# Patient Record
Sex: FEMALE | Race: WHITE
[De-identification: names, ages, dates, MRNs, and addresses within clinical notes are randomized per-mention and may not be internally consistent; named-entity substitution may affect disease eponyms.]

---

## 2017-05-31 ENCOUNTER — HOSPITAL ENCOUNTER (OUTPATIENT)
Dept: HOSPITAL 62 - OD | Age: 57
End: 2017-05-31
Attending: NURSE PRACTITIONER
Payer: COMMERCIAL

## 2017-05-31 DIAGNOSIS — Z13.220: Primary | ICD-10-CM

## 2017-05-31 LAB
ALBUMIN SERPL-MCNC: 4.6 G/DL (ref 3.5–5)
ALP SERPL-CCNC: 66 U/L (ref 38–126)
ALT SERPL-CCNC: 24 U/L (ref 9–52)
ANION GAP SERPL CALC-SCNC: 9 MMOL/L (ref 5–19)
AST SERPL-CCNC: 25 U/L (ref 14–36)
BASOPHILS # BLD AUTO: 0 10^3/UL (ref 0–0.2)
BASOPHILS NFR BLD AUTO: 0.6 % (ref 0–2)
BILIRUB DIRECT SERPL-MCNC: 0.3 MG/DL (ref 0–0.4)
BILIRUB SERPL-MCNC: 0.5 MG/DL (ref 0.2–1.3)
BUN SERPL-MCNC: 10 MG/DL (ref 7–20)
CALCIUM: 10 MG/DL (ref 8.4–10.2)
CHLORIDE SERPL-SCNC: 103 MMOL/L (ref 98–107)
CHOLEST SERPL-MCNC: 208.15 MG/DL (ref 0–200)
CO2 SERPL-SCNC: 28 MMOL/L (ref 22–30)
CREAT SERPL-MCNC: 0.84 MG/DL (ref 0.52–1.25)
DIRECT HDL: 77 MG/DL (ref 40–?)
EOSINOPHIL # BLD AUTO: 0.2 10^3/UL (ref 0–0.6)
EOSINOPHIL NFR BLD AUTO: 3.1 % (ref 0–6)
ERYTHROCYTE [DISTWIDTH] IN BLOOD BY AUTOMATED COUNT: 13.2 % (ref 11.5–14)
GLUCOSE SERPL-MCNC: 84 MG/DL (ref 75–110)
HCT VFR BLD CALC: 40.4 % (ref 36–47)
HGB BLD-MCNC: 13.2 G/DL (ref 12–15.5)
HGB HCT DIFFERENCE: -0.8
LDLC SERPL DIRECT ASSAY-MCNC: 107 MG/DL (ref ?–100)
LYMPHOCYTES # BLD AUTO: 2.8 10^3/UL (ref 0.5–4.7)
LYMPHOCYTES NFR BLD AUTO: 41.3 % (ref 13–45)
MCH RBC QN AUTO: 30.9 PG (ref 27–33.4)
MCHC RBC AUTO-ENTMCNC: 32.6 G/DL (ref 32–36)
MCV RBC AUTO: 95 FL (ref 80–97)
MONOCYTES # BLD AUTO: 0.3 10^3/UL (ref 0.1–1.4)
MONOCYTES NFR BLD AUTO: 4 % (ref 3–13)
NEUTROPHILS # BLD AUTO: 3.4 10^3/UL (ref 1.7–8.2)
NEUTS SEG NFR BLD AUTO: 51 % (ref 42–78)
POTASSIUM SERPL-SCNC: 4.4 MMOL/L (ref 3.6–5)
PROT SERPL-MCNC: 7.3 G/DL (ref 6.3–8.2)
RBC # BLD AUTO: 4.25 10^6/UL (ref 3.72–5.28)
SODIUM SERPL-SCNC: 140 MMOL/L (ref 137–145)
TRIGL SERPL-MCNC: 90 MG/DL (ref ?–150)
VLDLC SERPL CALC-MCNC: 18 MG/DL (ref 10–31)
WBC # BLD AUTO: 6.7 10^3/UL (ref 4–10.5)

## 2017-05-31 PROCEDURE — 80061 LIPID PANEL: CPT

## 2017-05-31 PROCEDURE — 80053 COMPREHEN METABOLIC PANEL: CPT

## 2017-05-31 PROCEDURE — 85025 COMPLETE CBC W/AUTO DIFF WBC: CPT

## 2017-05-31 PROCEDURE — 36415 COLL VENOUS BLD VENIPUNCTURE: CPT

## 2017-08-28 ENCOUNTER — HOSPITAL ENCOUNTER (OUTPATIENT)
Dept: HOSPITAL 62 - WI | Age: 57
End: 2017-08-28
Attending: INTERNAL MEDICINE
Payer: COMMERCIAL

## 2017-08-28 DIAGNOSIS — Z12.31: Primary | ICD-10-CM

## 2017-08-28 PROCEDURE — G0202 SCR MAMMO BI INCL CAD: HCPCS

## 2017-08-28 PROCEDURE — 77067 SCR MAMMO BI INCL CAD: CPT

## 2017-08-28 NOTE — WOMENS IMAGING REPORT
EXAM DESCRIPTION:  BILAT SCREENING MAMMO W/CAD



COMPLETED DATE/TIME:  8/28/2017 12:47 pm



REASON FOR STUDY:  ROUTINE SCREENING; Z12.31 Z12.31  ENCNTR SCREEN MAMMOGRAM FOR MALIGNANT NEOPLASM O
F TIFFANI



COMPARISON:  2013, 2015



TECHNIQUE:  Standard craniocaudal and mediolateral oblique views of each breast recorded using [employer]
l acquisition.



LIMITATIONS:  None.



FINDINGS:  No masses, calcifications or architectural distortion. No areas of suspicion.

Read with the assistance of CAD.

.[facility] - R2 Cenova Version 1.3

.[facility] - R2 Cenova Version 1.3

.[facility] - R2 Cenova Version 2.4

.[facility] - R2 Cenova Version 2.4

.[facility] - R2  Version 9.2



IMPRESSION:  NORMAL MAMMOGRAM.  BIRADS 1.



BREAST DENSITY:  d. The breasts are extremely dense, which lowers the sensitivity of mammography.



BIRAD:  1 NEGATIVE



RECOMMENDATION:  ROUTINE SCREENING



COMMENT:  The patient has been notified of the results by letter per SA requirements. Additional no
tification policies are in place for contacting patient with suspicious or incomplete findings.

Quality ID #225: The American College of Radiology recommends an annual screening mammogram for women
 aged 40 years or over. This facility utilizes a reminder system to ensure that all patients receive 
reminder letters, and/or direct phone calls for appointments. This includes reminders for routine scr
eening mammograms, diagnostic mammograms, or other Breast Imaging Interventions when appropriate.  Th
is patient will be placed in the appropriate reminder system.

The American College of Radiology (ACR) has developed recommendations for screening MRI of the breast
s in certain patient populations, to be used in conjunction with mammography.  Breast MRI surveillanc
e may be appropriate for women with more than 20% lifetime risk of developing breast cancer  as deter
mined by genetic testing, significant family history of the disease, or history of mantle radiation f
or Hodgkins Disease.  ACR Practice Guidelines 2008.



TECHNICAL DOCUMENTATION:  FINDING NUMBER: (1)

ASSESSMENT: (1)

JOB ID:  [id number]

 2011 [employer]- All Rights Reserved

## 2017-08-30 ENCOUNTER — HOSPITAL ENCOUNTER (OUTPATIENT)
Dept: HOSPITAL 62 - OD | Age: 57
End: 2017-08-30
Attending: INTERNAL MEDICINE
Payer: COMMERCIAL

## 2017-08-30 DIAGNOSIS — Z00.00: Primary | ICD-10-CM

## 2017-08-30 LAB
ALBUMIN SERPL-MCNC: 4.7 G/DL (ref 3.5–5)
ALP SERPL-CCNC: 74 U/L (ref 38–126)
ALT SERPL-CCNC: 29 U/L (ref 9–52)
ANION GAP SERPL CALC-SCNC: 10 MMOL/L (ref 5–19)
AST SERPL-CCNC: 23 U/L (ref 14–36)
BASOPHILS # BLD AUTO: 0 10^3/UL (ref 0–0.2)
BASOPHILS NFR BLD AUTO: 0.6 % (ref 0–2)
BILIRUB DIRECT SERPL-MCNC: 0.4 MG/DL (ref 0–0.4)
BILIRUB SERPL-MCNC: 0.6 MG/DL (ref 0.2–1.3)
BUN SERPL-MCNC: 11 MG/DL (ref 7–20)
CALCIUM: 10 MG/DL (ref 8.4–10.2)
CHLORIDE SERPL-SCNC: 102 MMOL/L (ref 98–107)
CHOLEST SERPL-MCNC: 214.12 MG/DL (ref 0–200)
CO2 SERPL-SCNC: 28 MMOL/L (ref 22–30)
CREAT SERPL-MCNC: 0.84 MG/DL (ref 0.52–1.25)
DIRECT HDL: 76 MG/DL (ref 40–?)
EOSINOPHIL # BLD AUTO: 0.2 10^3/UL (ref 0–0.6)
EOSINOPHIL NFR BLD AUTO: 2.8 % (ref 0–6)
ERYTHROCYTE [DISTWIDTH] IN BLOOD BY AUTOMATED COUNT: 13.4 % (ref 11.5–14)
GLUCOSE SERPL-MCNC: 87 MG/DL (ref 75–110)
HCT VFR BLD CALC: 38.7 % (ref 36–47)
HGB BLD-MCNC: 13.6 G/DL (ref 12–15.5)
HGB HCT DIFFERENCE: 2.1
LDLC SERPL DIRECT ASSAY-MCNC: 116 MG/DL (ref ?–100)
LYMPHOCYTES # BLD AUTO: 2.8 10^3/UL (ref 0.5–4.7)
LYMPHOCYTES NFR BLD AUTO: 36.9 % (ref 13–45)
MCH RBC QN AUTO: 32.6 PG (ref 27–33.4)
MCHC RBC AUTO-ENTMCNC: 35.1 G/DL (ref 32–36)
MCV RBC AUTO: 93 FL (ref 80–97)
MONOCYTES # BLD AUTO: 0.3 10^3/UL (ref 0.1–1.4)
MONOCYTES NFR BLD AUTO: 4 % (ref 3–13)
NEUTROPHILS # BLD AUTO: 4.2 10^3/UL (ref 1.7–8.2)
NEUTS SEG NFR BLD AUTO: 55.7 % (ref 42–78)
POTASSIUM SERPL-SCNC: 4.2 MMOL/L (ref 3.6–5)
PROT SERPL-MCNC: 7.1 G/DL (ref 6.3–8.2)
RBC # BLD AUTO: 4.16 10^6/UL (ref 3.72–5.28)
SODIUM SERPL-SCNC: 139.8 MMOL/L (ref 137–145)
TRIGL SERPL-MCNC: 107 MG/DL (ref ?–150)
VLDLC SERPL CALC-MCNC: 21 MG/DL (ref 10–31)
WBC # BLD AUTO: 7.6 10^3/UL (ref 4–10.5)

## 2017-08-30 PROCEDURE — 80053 COMPREHEN METABOLIC PANEL: CPT

## 2017-08-30 PROCEDURE — 85025 COMPLETE CBC W/AUTO DIFF WBC: CPT

## 2017-08-30 PROCEDURE — 36415 COLL VENOUS BLD VENIPUNCTURE: CPT

## 2017-08-30 PROCEDURE — 80061 LIPID PANEL: CPT

## 2019-07-30 ENCOUNTER — HOSPITAL ENCOUNTER (OUTPATIENT)
Dept: HOSPITAL 62 - SC | Age: 59
End: 2019-07-30
Attending: OPHTHALMOLOGY
Payer: COMMERCIAL

## 2019-07-30 DIAGNOSIS — Z87.891: ICD-10-CM

## 2019-07-30 DIAGNOSIS — Z79.82: ICD-10-CM

## 2019-07-30 DIAGNOSIS — K21.9: ICD-10-CM

## 2019-07-30 DIAGNOSIS — Z79.899: ICD-10-CM

## 2019-07-30 DIAGNOSIS — H25.813: Primary | ICD-10-CM

## 2019-07-30 DIAGNOSIS — H43.813: ICD-10-CM

## 2019-07-30 PROCEDURE — V2632 POST CHMBR INTRAOCULAR LENS: HCPCS

## 2019-07-30 PROCEDURE — 66984 XCAPSL CTRC RMVL W/O ECP: CPT

## 2019-07-30 RX ADMIN — CYCLOPENTOLATE HYDROCHLORIDE AND PHENYLEPHRINE HYDROCHLORIDE PRN DROP: 2; 10 SOLUTION/ DROPS OPHTHALMIC at 09:50

## 2019-07-30 RX ADMIN — EPINEPHRINE ONE MG: 1 INJECTION, SOLUTION, CONCENTRATE INTRAVENOUS at 00:00

## 2019-07-30 RX ADMIN — LIDOCAINE HYDROCHLORIDE PRN ML: 40 INJECTION, SOLUTION RETROBULBAR; TOPICAL at 10:32

## 2019-07-30 RX ADMIN — LIDOCAINE HYDROCHLORIDE PRN ML: 40 INJECTION, SOLUTION RETROBULBAR; TOPICAL at 00:00

## 2019-07-30 RX ADMIN — TROPICAMIDE PRN DROP: 10 SOLUTION/ DROPS OPHTHALMIC at 09:59

## 2019-07-30 RX ADMIN — TROPICAMIDE PRN DROP: 10 SOLUTION/ DROPS OPHTHALMIC at 09:49

## 2019-07-30 RX ADMIN — BUPIVACAINE HYDROCHLORIDE PRN ML: 7.5 INJECTION, SOLUTION EPIDURAL; RETROBULBAR at 00:00

## 2019-07-30 RX ADMIN — TETRACAINE HYDROCHLORIDE PRN DROP: 5 SOLUTION OPHTHALMIC at 00:00

## 2019-07-30 RX ADMIN — TROPICAMIDE PRN DROP: 10 SOLUTION/ DROPS OPHTHALMIC at 10:07

## 2019-07-30 RX ADMIN — CYCLOPENTOLATE HYDROCHLORIDE AND PHENYLEPHRINE HYDROCHLORIDE PRN DROP: 2; 10 SOLUTION/ DROPS OPHTHALMIC at 10:07

## 2019-07-30 RX ADMIN — BESIFLOXACIN PRN DROP: 6 SUSPENSION OPHTHALMIC at 10:08

## 2019-07-30 RX ADMIN — CYCLOPENTOLATE HYDROCHLORIDE AND PHENYLEPHRINE HYDROCHLORIDE PRN DROP: 2; 10 SOLUTION/ DROPS OPHTHALMIC at 09:59

## 2019-07-30 RX ADMIN — SODIUM CHONDROITIN SULFATE / SODIUM HYALURONATE ONE EACH: 0.55-0.5 INJECTION INTRAOCULAR at 00:00

## 2019-07-30 RX ADMIN — SODIUM CHONDROITIN SULFATE / SODIUM HYALURONATE ONE EACH: 0.55-0.5 INJECTION INTRAOCULAR at 10:48

## 2019-07-30 RX ADMIN — EPINEPHRINE ONE MG: 1 INJECTION, SOLUTION, CONCENTRATE INTRAVENOUS at 10:48

## 2019-07-30 RX ADMIN — HEPARIN SODIUM ONE ML: 1000 INJECTION, SOLUTION INTRAVENOUS; SUBCUTANEOUS at 10:48

## 2019-07-30 RX ADMIN — BESIFLOXACIN PRN DROP: 6 SUSPENSION OPHTHALMIC at 11:04

## 2019-07-30 RX ADMIN — TETRACAINE HYDROCHLORIDE PRN DROP: 5 SOLUTION OPHTHALMIC at 10:08

## 2019-07-30 RX ADMIN — BESIFLOXACIN PRN DROP: 6 SUSPENSION OPHTHALMIC at 09:50

## 2019-07-30 RX ADMIN — BUPIVACAINE HYDROCHLORIDE PRN ML: 7.5 INJECTION, SOLUTION EPIDURAL; RETROBULBAR at 10:32

## 2019-07-30 RX ADMIN — TETRACAINE HYDROCHLORIDE PRN DROP: 5 SOLUTION OPHTHALMIC at 09:49

## 2019-07-30 RX ADMIN — DORZOLAMIDE HYDROCHLORIDE AND TIMOLOL MALEATE PRN DROP: 20; 5 SOLUTION OPHTHALMIC at 11:04

## 2019-07-30 RX ADMIN — HEPARIN SODIUM ONE ML: 1000 INJECTION, SOLUTION INTRAVENOUS; SUBCUTANEOUS at 00:00

## 2019-07-30 RX ADMIN — DORZOLAMIDE HYDROCHLORIDE AND TIMOLOL MALEATE PRN DROP: 20; 5 SOLUTION OPHTHALMIC at 00:00

## 2019-07-30 RX ADMIN — TETRACAINE HYDROCHLORIDE PRN DROP: 5 SOLUTION OPHTHALMIC at 10:32

## 2019-07-30 RX ADMIN — BESIFLOXACIN PRN DROP: 6 SUSPENSION OPHTHALMIC at 00:00

## 2019-07-30 NOTE — [facility] DISCHARGE SUMMARY E
[facility] Discharge Summary



NAME: TAMARA IRIZARRY

                                      MRN: [id number]

                                      AGE: 59Y

ADMITTED: 07/30/2019           DISCHARGED: 07/30/2019



FINAL DIAGNOSIS:

Cataract, right eye.



HOSPITAL COURSE:

The patient is a 59-year-old lady who underwent uneventful cataract

extraction with intraocular lens implant, right eye, on 07/30/2019.  She

will be discharged to home.  She was instructed to resume preoperative

medications; to take Tylenol as needed for discomfort; to keep her eye

shielded; to use Pred Forte, Prolensa, and moxifloxacin at 3 p.m. and

8 p.m.; and to follow up in my office in 1 day.



DICTATING PHYSICIAN: APURVA HANSON M.D.



1209M              DT: 07/30/2019 1255

PHY#: [id number]        DD: 07/30/2019 1248

ID:   [id number]               JOB#: [id number]       ACCT: [id number]



cc:APURVA HANSON M.D.

>

## 2019-07-30 NOTE — [facility] OPERATIVE REPORT E
[facility] Operative Report



NAME: TAMARA IRIZARRY

                                      MRN: [id number]

                                      AGE: 59Y

DATE OF SURGERY: 07/30/2019          ROOM:



PREOPERATIVE DIAGNOSIS:

Cataract, right eye.



POSTOPERATIVE DIAGNOSIS:

Cataract, right eye.



PROCEDURE PERFORMED:

Phacoemulsification with posterior chamber intraocular lens, right eye.



SURGEON:

APURVA HANSON M.D.



ANESTHESIA:

Topical with MAC plus intraocular lidocaine.



INDICATIONS FOR SURGERY:

Difficulty driving.



PROCEDURE:

The patient was brought to the operating room and placed on the operative

table.  Following tetracaine drops, topical anesthesia was administered. 

This consisted of instrument wipe pledgets soaked in a solution of 4%

Xylocaine mixed with 0.75% Marcaine in a 1:2 ratio.  A 2 x 1 cm pledget was

placed in the superior fornix.  A 1 x 1 cm pledget was placed in the

inferior fornix.  The eye was patched shut for 5 minutes.  The patch was

removed.  The eye was sterilely prepped and draped in the usual manner. 

Lid speculum was placed in the eye.  The pledgets were removed and 4-0

black silk sutures were placed around the superior and the inferior rectus

muscles to be used as traction.  A conjunctival peritomy was made at the

10 o'clock position.  Hemostasis was obtained with bipolar cautery.  A

posterior limbal groove was created using a crescent knife and dissected

anteriorly towards the cornea.  A sharp point blade was used to create a

paracentesis site at the 2 o'clock position.  A 2.4 mm keratome was used to

enter the anterior chamber through the groove.  Viscoelastic was injected

into the anterior chamber.  An anterior capsulotomy was performed using

Utrata forceps in a capsulorrhexis fashion.  Hydrodissection and

hydrodelineation were performed.  Phacoemulsification was performed in

divide-and-conquer technique.  Total phaco time was 7.10 CDE.



Following this, the I/A unit was used to remove residual cortex.

Viscoelastic was injected into the capsular bag.  Intraocular lens model

SN60WF, 17.0 diopters, serial number [id number], was placed in the

capsular bag.  The I/A unit was used to remove residual viscoelastic.  The

wound was seen to be watertight under high and low pressure, and no sutures

were placed.  The intraocular lens was well centered.  The pressure was

adjusted in the eye to normal pressure.  The 4-0 black silk sutures and lid

speculum were removed.  The eye was shielded after Besivance drops were

placed.  The patient tolerated the procedure well and was sent to the

recovery room in good condition.



A drop of Cosopt was placed in the eye at the end of the surgery.



DICTATING PHYSICIAN: APURVA HANSON M.D.



1209M              DT: 07/30/2019 1254

PHY#: [id number]        DD: 07/30/2019 1248

ID:   [id number]               JOB#: [id number]       ACCT: [id number]



cc:APURVA HANSON M.D.

>

## 2019-08-13 ENCOUNTER — HOSPITAL ENCOUNTER (OUTPATIENT)
Dept: HOSPITAL 62 - SC | Age: 59
Discharge: HOME | End: 2019-08-13
Attending: OPHTHALMOLOGY
Payer: COMMERCIAL

## 2019-08-13 DIAGNOSIS — Z79.1: ICD-10-CM

## 2019-08-13 DIAGNOSIS — H25.812: Primary | ICD-10-CM

## 2019-08-13 PROCEDURE — 66984 XCAPSL CTRC RMVL W/O ECP: CPT

## 2019-08-13 RX ADMIN — TROPICAMIDE PRN DROP: 10 SOLUTION/ DROPS OPHTHALMIC at 07:53

## 2019-08-13 RX ADMIN — BESIFLOXACIN PRN DROP: 6 SUSPENSION OPHTHALMIC at 08:52

## 2019-08-13 RX ADMIN — DORZOLAMIDE HYDROCHLORIDE AND TIMOLOL MALEATE PRN DROP: 20; 5 SOLUTION OPHTHALMIC at 08:52

## 2019-08-13 RX ADMIN — BESIFLOXACIN PRN DROP: 6 SUSPENSION OPHTHALMIC at 07:44

## 2019-08-13 RX ADMIN — TETRACAINE HYDROCHLORIDE PRN DROP: 5 SOLUTION OPHTHALMIC at 08:27

## 2019-08-13 RX ADMIN — BESIFLOXACIN PRN DROP: 6 SUSPENSION OPHTHALMIC at 08:03

## 2019-08-13 RX ADMIN — BESIFLOXACIN PRN DROP: 6 SUSPENSION OPHTHALMIC at 07:43

## 2019-08-13 RX ADMIN — CYCLOPENTOLATE HYDROCHLORIDE AND PHENYLEPHRINE HYDROCHLORIDE PRN DROP: 2; 10 SOLUTION/ DROPS OPHTHALMIC at 07:53

## 2019-08-13 RX ADMIN — TROPICAMIDE PRN DROP: 10 SOLUTION/ DROPS OPHTHALMIC at 08:03

## 2019-08-13 RX ADMIN — TETRACAINE HYDROCHLORIDE PRN DROP: 5 SOLUTION OPHTHALMIC at 08:03

## 2019-08-13 RX ADMIN — BESIFLOXACIN PRN DROP: 6 SUSPENSION OPHTHALMIC at 00:00

## 2019-08-13 RX ADMIN — CYCLOPENTOLATE HYDROCHLORIDE AND PHENYLEPHRINE HYDROCHLORIDE PRN DROP: 2; 10 SOLUTION/ DROPS OPHTHALMIC at 07:43

## 2019-08-13 RX ADMIN — CYCLOPENTOLATE HYDROCHLORIDE AND PHENYLEPHRINE HYDROCHLORIDE PRN DROP: 2; 10 SOLUTION/ DROPS OPHTHALMIC at 08:03

## 2019-08-13 RX ADMIN — DORZOLAMIDE HYDROCHLORIDE AND TIMOLOL MALEATE PRN DROP: 20; 5 SOLUTION OPHTHALMIC at 00:00

## 2019-08-13 RX ADMIN — TROPICAMIDE PRN DROP: 10 SOLUTION/ DROPS OPHTHALMIC at 07:43

## 2019-08-13 RX ADMIN — TETRACAINE HYDROCHLORIDE PRN DROP: 5 SOLUTION OPHTHALMIC at 07:44

## 2019-08-13 NOTE — OPERATIVE REPORT
[facility]ative Report-[facility]


Operative Report: 


DATE OF SURGERY: 8/31/2019


PREOPERATIVE DIAGNOSIS: CATARACT, LEFT EYE.


POSTOPERATIVE DIAGNOSIS: CATARACT, LEFT EYE.


PROCEDURE PERFORMED: PHACOEMULSIFICATION WITH POSTERIOR CHAMBER INTRAOCULAR 

LENS, LEFT EYE.


Intraocular Lens Model : SN 60 WF 16.5


Total Phaco Time: 6.56 CDE


SURGEON: APURVA HANSON MD


ANESTHESIA: TOPICAL WITH MAC.


INDICATIONS FOR SURGERY: Difficulty driving at night


PROCEDURE:


The patient was brought to the Operating Room and placed on the operative table.

Following tetracaine drops, topical anesthesia was administered. This consisted 

of instrument wipe pledgets soaked in a solution of 4% Xylocaine mixed with 

0.75% Marcaine in a 1:2 ratio. A 2 x 1 cm pledget was placed in the superior 

fornix. A 1 x 1 cm pledget was placed in the inferior fornix. The eye was 

patched shut for 5 minutes. The patch was removed. The eye was sterilely prepped

and draped in the usual manner. Lid speculum was placed in the eye. The pledgets

were removed. 4-0 black silk sutures were placed around the superior and the 

inferior rectus muscles to be used as traction. A conjunctival peritomy was made

at the 10 o'clock position. Hemostasis was obtained with bipolar cautery. A 

posterior limbal groove was created using a crescent knife and dissected 

anteriorly towards the cornea. A sharp point blade was used to create a 

paracentesis site at the 2 o'clock position. 0.2 cc non preserved Lidocaine was 

injected into the anterior chamber. A 2.4 mm keratome was used to enter the 

anterior chamber through the groove. Viscoelastic was injected into the 

anteriorchamber. An anterior capsulotomy was performed using Utrata forceps in 

acapsulorrhexis fashion. Hydrodissection and hydrodelineation were performed. 

Phacoemulsification was performed in divide-and-conquer technique.


Following this, the I/A unit was used to remove residual cortex. Viscoelastic 

was injected into the capsular bag. The Intraocular lens was placed in the 

capsular bag. The I/A unit was used to remove residual viscoelastic. The wound 

was seen to be watertight under high and low pressure, and no sutures were 

placed. The intraocular lens was well centered. The pressure was adjusted in the

eye to normal pressure. The 4-0 black silk sutures and lid speculum were 

removed. The eye was shielded after Besivance and Cosopt drops were placed. The 

patient tolerated the procedure well and was sent to the Recovery Room in good 

condition.

## 2019-08-13 NOTE — [facility]OC DISCHARGE SUMMARY
[facility]charge Summary-[facility]


Discharge Summary: 


DATE: August 13, 2019


FINAL DIAGNOSIS: CATARACT, LEFT EYE


PROCEDURE: Cataract surgery with intraocular lens implant left eye


HOSPITAL COURSE:


The patient will be discharged to home. The patient is instructed to resume 

preoperative medications, take Tylenol as needed for discomfort, to keep the eye

shielded, They should use the prescribed antibiotic, NSAID, and steroid at 3 PM 

and 8 PM, and to follow up in my office in 1 day.